# Patient Record
Sex: FEMALE | Race: OTHER | NOT HISPANIC OR LATINO | URBAN - METROPOLITAN AREA
[De-identification: names, ages, dates, MRNs, and addresses within clinical notes are randomized per-mention and may not be internally consistent; named-entity substitution may affect disease eponyms.]

---

## 2017-09-08 ENCOUNTER — EMERGENCY (EMERGENCY)
Facility: HOSPITAL | Age: 57
LOS: 1 days | Discharge: HOME | End: 2017-09-08
Attending: EMERGENCY MEDICINE
Payer: MEDICAID

## 2017-09-08 VITALS
WEIGHT: 154.98 LBS | RESPIRATION RATE: 16 BRPM | DIASTOLIC BLOOD PRESSURE: 86 MMHG | TEMPERATURE: 98 F | HEART RATE: 76 BPM | HEIGHT: 65 IN | OXYGEN SATURATION: 100 % | SYSTOLIC BLOOD PRESSURE: 151 MMHG

## 2017-09-08 DIAGNOSIS — Z79.84 LONG TERM (CURRENT) USE OF ORAL HYPOGLYCEMIC DRUGS: ICD-10-CM

## 2017-09-08 DIAGNOSIS — E11.9 TYPE 2 DIABETES MELLITUS WITHOUT COMPLICATIONS: ICD-10-CM

## 2017-09-08 PROCEDURE — 73562 X-RAY EXAM OF KNEE 3: CPT | Mod: 26,LT

## 2017-09-08 PROCEDURE — 99283 EMERGENCY DEPT VISIT LOW MDM: CPT | Mod: 25

## 2017-09-08 PROCEDURE — 73562 X-RAY EXAM OF KNEE 3: CPT

## 2017-09-08 RX ORDER — METFORMIN HYDROCHLORIDE 850 MG/1
1 TABLET ORAL
Qty: 28 | Refills: 0
Start: 2017-09-08 | End: 2017-09-22

## 2017-09-08 NOTE — ED PROVIDER NOTE - OBJECTIVE STATEMENT
57 female from [place], DM, on metformin 500 bid, states she ran out of metformin this am. No associated symptoms. On ROS pt notes the inside of R nostril is dry and mildly painful and L knee has been bothersome for a while. No trauma, no fevers. Does not have a [facility] doctor. No fevers. No cp or sob. No leg swelling.

## 2017-09-08 NOTE — ED PROVIDER NOTE - PROGRESS NOTE DETAILS
pt does not know which pharmacy - likely causing pt harm if pt does not get medication today. will print paper presciption instead of erx for this reason.

## 2017-09-08 NOTE — ED PROVIDER NOTE - NS ED ROS FT
ROS: As noted in HPI, otherwise below --  Constitutional symptoms: Negative  Eyes: Negative  Ears, Nose, Mouth, Throat: +  Cardiovascular: Negative  Respiratory: Negative  Gastrointestinal: Negative  Genitourinary: Negative  Musculoskeletal: +  Integumentary: Negative  Neurological: Negative  Psychiatric: Negative  Endocrine: Negative  Allergic/Immunologic: Negative

## 2017-09-08 NOTE — ED PROVIDER NOTE - CARE PLAN
Principal Discharge DX:	Type 2 diabetes mellitus with complication, unspecified long term insulin use status  Goal:	L knee pain, nose discomfort

## 2017-09-08 NOTE — ED PROVIDER NOTE - PHYSICAL EXAMINATION
Gen: well appearing, of stated age, no acute distress; Head: NC, AT; ENT: MMM, no uvular deviation, mild erythema R nasal septum; Neck: supple with full ROM; Chest: CTAB, no retractions, rate normal, appears to breath comfortable; Heart: RRR S1S2 No JVD No peripheral edema b/l pulses 2+ in arms and legs; Abd: Soft non-tender, no rebound or guarding, no masses, no valencia sign, no mcburney tenderness, no CVAT; Back: No spinal deformity; Ext: Moving all 4 limbs without obvious impairment to ROM, no obvious weakness, mild edema L knee w/o warmth, ttp or diminished rom; Neuro: fluid speech, no focal deficits, oriented to person, place, situation; Psych: No anxiety, depression or pressured speech noted; Skin: no utricaria, no diffuse rash. -ncohen

## 2017-09-08 NOTE — ED ADULT NURSE NOTE - OBJECTIVE STATEMENT
AS PER SISTER  PATIENT HAS PAIN TO NOSE , ,PAIN TO LEFT KNEE,X2 DAYS.ALSO C/O DIABETES .AS PER SISTER SHE RAN OUT OF METFORMIN THIS MORNING. AS PER SISTER  PATIENT HAS PAIN TO NOSE , ,PAIN TO LEFT KNEE,X2 DAYS.ALSO C/O DIABETES .AS PER SISTER SHE RAN OUT OF METFORMIN THIS MORNING.REFUSED REPEAT VS.

## 2017-09-08 NOTE — ED PROVIDER NOTE - MEDICAL DECISION MAKING DETAILS
do not suspect septic joint, chronic arthritis more likely, nasal symptom unclear will refer to ENT. pt states she has insurance so this will not be a prob. will xr L knee and check bg.

## 2017-09-08 NOTE — ED ADULT NURSE NOTE - CHIEF COMPLAINT QUOTE
" she's diabetic and with blood pressure, her nose is bothering her and pain in her leg for 3 months" as per sister interpreting, " she needs machine for her diabetis '

## 2017-12-19 NOTE — ED ADULT TRIAGE NOTE - CHIEF COMPLAINT QUOTE
" she's diabetic and with blood pressure, her nose is bothering her and pain in her leg for 3 months" as per sister interpreting " she's diabetic and with blood pressure, her nose is bothering her and pain in her leg for 3 months" as per sister interpreting, " she needs machine for her diabetis ' home

## 2019-10-30 ENCOUNTER — INPATIENT (INPATIENT)
Facility: HOSPITAL | Age: 59
LOS: 1 days | Discharge: HOME | DRG: 641 | End: 2019-11-01
Attending: STUDENT IN AN ORGANIZED HEALTH CARE EDUCATION/TRAINING PROGRAM | Admitting: STUDENT IN AN ORGANIZED HEALTH CARE EDUCATION/TRAINING PROGRAM
Payer: MEDICAID

## 2019-10-30 VITALS
RESPIRATION RATE: 18 BRPM | TEMPERATURE: 98 F | DIASTOLIC BLOOD PRESSURE: 84 MMHG | HEART RATE: 132 BPM | WEIGHT: 132.28 LBS | OXYGEN SATURATION: 98 % | SYSTOLIC BLOOD PRESSURE: 140 MMHG

## 2019-10-30 LAB
ALBUMIN SERPL ELPH-MCNC: 4 G/DL — SIGNIFICANT CHANGE UP (ref 3.5–5)
ALP SERPL-CCNC: 77 U/L — SIGNIFICANT CHANGE UP (ref 40–120)
ALT FLD-CCNC: 99 U/L DA — HIGH (ref 10–60)
ANION GAP SERPL CALC-SCNC: 11 MMOL/L — SIGNIFICANT CHANGE UP (ref 5–17)
APPEARANCE UR: CLEAR — SIGNIFICANT CHANGE UP
APTT BLD: 29.6 SEC — SIGNIFICANT CHANGE UP (ref 27.5–36.3)
AST SERPL-CCNC: 95 U/L — HIGH (ref 10–40)
BASOPHILS # BLD AUTO: 0.01 K/UL — SIGNIFICANT CHANGE UP (ref 0–0.2)
BASOPHILS NFR BLD AUTO: 0.2 % — SIGNIFICANT CHANGE UP (ref 0–2)
BILIRUB SERPL-MCNC: 0.6 MG/DL — SIGNIFICANT CHANGE UP (ref 0.2–1.2)
BILIRUB UR-MCNC: NEGATIVE — SIGNIFICANT CHANGE UP
BUN SERPL-MCNC: 13 MG/DL — SIGNIFICANT CHANGE UP (ref 7–18)
CALCIUM SERPL-MCNC: 8.6 MG/DL — SIGNIFICANT CHANGE UP (ref 8.4–10.5)
CHLORIDE SERPL-SCNC: 97 MMOL/L — SIGNIFICANT CHANGE UP (ref 96–108)
CO2 SERPL-SCNC: 26 MMOL/L — SIGNIFICANT CHANGE UP (ref 22–31)
COLOR SPEC: YELLOW — SIGNIFICANT CHANGE UP
CREAT SERPL-MCNC: 1.02 MG/DL — SIGNIFICANT CHANGE UP (ref 0.5–1.3)
DIFF PNL FLD: NEGATIVE — SIGNIFICANT CHANGE UP
EOSINOPHIL # BLD AUTO: 0.13 K/UL — SIGNIFICANT CHANGE UP (ref 0–0.5)
EOSINOPHIL NFR BLD AUTO: 3.1 % — SIGNIFICANT CHANGE UP (ref 0–6)
FLU A RESULT: SIGNIFICANT CHANGE UP
FLU A RESULT: SIGNIFICANT CHANGE UP
FLUAV AG NPH QL: SIGNIFICANT CHANGE UP
FLUBV AG NPH QL: SIGNIFICANT CHANGE UP
GLUCOSE SERPL-MCNC: 334 MG/DL — HIGH (ref 70–99)
GLUCOSE UR QL: 1000 MG/DL
HCT VFR BLD CALC: 31 % — LOW (ref 34.5–45)
HGB BLD-MCNC: 10.6 G/DL — LOW (ref 11.5–15.5)
IMM GRANULOCYTES NFR BLD AUTO: 0.2 % — SIGNIFICANT CHANGE UP (ref 0–1.5)
INR BLD: 1.1 RATIO — SIGNIFICANT CHANGE UP (ref 0.88–1.16)
KETONES UR-MCNC: NEGATIVE — SIGNIFICANT CHANGE UP
LACTATE SERPL-SCNC: 2.7 MMOL/L — HIGH (ref 0.7–2)
LACTATE SERPL-SCNC: 4.7 MMOL/L — CRITICAL HIGH (ref 0.7–2)
LEUKOCYTE ESTERASE UR-ACNC: ABNORMAL
LYMPHOCYTES # BLD AUTO: 1.07 K/UL — SIGNIFICANT CHANGE UP (ref 1–3.3)
LYMPHOCYTES # BLD AUTO: 25.8 % — SIGNIFICANT CHANGE UP (ref 13–44)
MCHC RBC-ENTMCNC: 28.2 PG — SIGNIFICANT CHANGE UP (ref 27–34)
MCHC RBC-ENTMCNC: 34.2 GM/DL — SIGNIFICANT CHANGE UP (ref 32–36)
MCV RBC AUTO: 82.4 FL — SIGNIFICANT CHANGE UP (ref 80–100)
MONOCYTES # BLD AUTO: 0.44 K/UL — SIGNIFICANT CHANGE UP (ref 0–0.9)
MONOCYTES NFR BLD AUTO: 10.6 % — SIGNIFICANT CHANGE UP (ref 2–14)
NEUTROPHILS # BLD AUTO: 2.48 K/UL — SIGNIFICANT CHANGE UP (ref 1.8–7.4)
NEUTROPHILS NFR BLD AUTO: 60.1 % — SIGNIFICANT CHANGE UP (ref 43–77)
NITRITE UR-MCNC: NEGATIVE — SIGNIFICANT CHANGE UP
NRBC # BLD: 0 /100 WBCS — SIGNIFICANT CHANGE UP (ref 0–0)
PH UR: 7 — SIGNIFICANT CHANGE UP (ref 5–8)
PLATELET # BLD AUTO: 190 K/UL — SIGNIFICANT CHANGE UP (ref 150–400)
POTASSIUM SERPL-MCNC: 2.8 MMOL/L — CRITICAL LOW (ref 3.5–5.3)
POTASSIUM SERPL-SCNC: 2.8 MMOL/L — CRITICAL LOW (ref 3.5–5.3)
PROT SERPL-MCNC: 7.5 G/DL — SIGNIFICANT CHANGE UP (ref 6–8.3)
PROT UR-MCNC: NEGATIVE — SIGNIFICANT CHANGE UP
PROTHROM AB SERPL-ACNC: 12.3 SEC — SIGNIFICANT CHANGE UP (ref 10–12.9)
RBC # BLD: 3.76 M/UL — LOW (ref 3.8–5.2)
RBC # FLD: 15 % — HIGH (ref 10.3–14.5)
RSV RESULT: SIGNIFICANT CHANGE UP
RSV RNA RESP QL NAA+PROBE: SIGNIFICANT CHANGE UP
SODIUM SERPL-SCNC: 134 MMOL/L — LOW (ref 135–145)
SP GR SPEC: 1.01 — SIGNIFICANT CHANGE UP (ref 1.01–1.02)
UROBILINOGEN FLD QL: NEGATIVE — SIGNIFICANT CHANGE UP
WBC # BLD: 4.14 K/UL — SIGNIFICANT CHANGE UP (ref 3.8–10.5)
WBC # FLD AUTO: 4.14 K/UL — SIGNIFICANT CHANGE UP (ref 3.8–10.5)

## 2019-10-30 PROCEDURE — 71045 X-RAY EXAM CHEST 1 VIEW: CPT | Mod: 26

## 2019-10-30 RX ORDER — POTASSIUM CHLORIDE 20 MEQ
10 PACKET (EA) ORAL
Refills: 0 | Status: COMPLETED | OUTPATIENT
Start: 2019-10-30 | End: 2019-10-31

## 2019-10-30 RX ORDER — SODIUM CHLORIDE 9 MG/ML
1000 INJECTION INTRAMUSCULAR; INTRAVENOUS; SUBCUTANEOUS ONCE
Refills: 0 | Status: COMPLETED | OUTPATIENT
Start: 2019-10-30 | End: 2019-10-30

## 2019-10-30 RX ORDER — POTASSIUM CHLORIDE 20 MEQ
10 PACKET (EA) ORAL
Refills: 0 | Status: DISCONTINUED | OUTPATIENT
Start: 2019-10-30 | End: 2019-10-30

## 2019-10-30 RX ORDER — PIPERACILLIN AND TAZOBACTAM 4; .5 G/20ML; G/20ML
3.38 INJECTION, POWDER, LYOPHILIZED, FOR SOLUTION INTRAVENOUS ONCE
Refills: 0 | Status: COMPLETED | OUTPATIENT
Start: 2019-10-30 | End: 2019-10-30

## 2019-10-30 RX ORDER — SODIUM CHLORIDE 9 MG/ML
1850 INJECTION INTRAMUSCULAR; INTRAVENOUS; SUBCUTANEOUS ONCE
Refills: 0 | Status: COMPLETED | OUTPATIENT
Start: 2019-10-30 | End: 2019-10-30

## 2019-10-30 RX ADMIN — SODIUM CHLORIDE 1000 MILLILITER(S): 9 INJECTION INTRAMUSCULAR; INTRAVENOUS; SUBCUTANEOUS at 23:22

## 2019-10-30 RX ADMIN — SODIUM CHLORIDE 1850 MILLILITER(S): 9 INJECTION INTRAMUSCULAR; INTRAVENOUS; SUBCUTANEOUS at 19:10

## 2019-10-30 RX ADMIN — PIPERACILLIN AND TAZOBACTAM 3.38 GRAM(S): 4; .5 INJECTION, POWDER, LYOPHILIZED, FOR SOLUTION INTRAVENOUS at 20:00

## 2019-10-30 RX ADMIN — Medication 100 MILLIEQUIVALENT(S): at 23:21

## 2019-10-30 RX ADMIN — Medication 100 MILLIEQUIVALENT(S): at 21:37

## 2019-10-30 RX ADMIN — SODIUM CHLORIDE 1850 MILLILITER(S): 9 INJECTION INTRAMUSCULAR; INTRAVENOUS; SUBCUTANEOUS at 18:08

## 2019-10-30 RX ADMIN — PIPERACILLIN AND TAZOBACTAM 200 GRAM(S): 4; .5 INJECTION, POWDER, LYOPHILIZED, FOR SOLUTION INTRAVENOUS at 18:08

## 2019-10-30 RX ADMIN — SODIUM CHLORIDE 1000 MILLILITER(S): 9 INJECTION INTRAMUSCULAR; INTRAVENOUS; SUBCUTANEOUS at 01:11

## 2019-10-30 NOTE — ED PROVIDER NOTE - PROGRESS NOTE DETAILS
Patient is resting comfortably, NAD. Patient remains comfortable. Still tachycardic after IV fluids. Will give more IV fluids Patient still tachy. Will add troponin for possible myocarditis Patient is resting comfortably, NAD. Endorsed to DR. Ventura.

## 2019-10-30 NOTE — ED ADULT NURSE NOTE - OBJECTIVE STATEMENT
pt came in for c/o dizziness. pt also c/o fever, BLE joint pain & swelling. pt was dx with dengue fever 1 month ago after [place] the [place] from [facility]. Pt denies cp, sob, cough, HA. breathing unlabored.

## 2019-10-30 NOTE — ED ADULT TRIAGE NOTE - CHIEF COMPLAINT QUOTE
as per the daughter pt started having fever and joint pain and b/l leg swelling . pt had h/o Dengue fever 1 month ago came from [facility] on 10/29

## 2019-10-30 NOTE — ED PROVIDER NOTE - CLINICAL SUMMARY MEDICAL DECISION MAKING FREE TEXT BOX
Patient with sepsis due to unknown source requires inpatient admission for inpatient antibiotic and iv fluid.

## 2019-10-30 NOTE — ED ADULT NURSE NOTE - CHIEF COMPLAINT QUOTE
as per the daughter pt started having fever and joint pain and b/l leg swelling . pt had h/o Dengue fever 1 month ago came from [facility] on 10/29

## 2019-10-30 NOTE — ED ADULT NURSE NOTE - NSIMPLEMENTINTERV_GEN_ALL_ED
Implemented All Universal Safety Interventions:  [place] to call system. Call bell, personal items and telephone within reach. Instruct patient to call for assistance. Room bathroom lighting operational. Non-slip footwear when patient is off stretcher. Physically safe environment: no spills, clutter or unnecessary equipment. Stretcher in lowest position, wheels locked, appropriate side rails in place.

## 2019-10-30 NOTE — ED ADULT NURSE NOTE - ED STAT RN HANDOFF DETAILS 2
pt.remained  stable  denies  pain. on cm  sinus  tachycardia. denies  chest  pain. transfer to holding  area. report given to rn sopheap.pt.not  in  distress

## 2019-10-30 NOTE — ED PROVIDER NOTE - OBJECTIVE STATEMENT
58 y/o female with a PMHx of dengue fever diagnosed in [facility] and no significant PSHx presents to the ER aftrr being diagnosed with dengue fever and staying in the hospital in [facility] for a week . Patient states that after that she now has intermittent fever and weakness. Patient states that she returned from [facility] yesterday. Patient denies headache, chest pain, shortness of breath , nausea, vomiting, diarrhea, or any other acute complaints. NKDA

## 2019-10-31 DIAGNOSIS — E11.9 TYPE 2 DIABETES MELLITUS WITHOUT COMPLICATIONS: ICD-10-CM

## 2019-10-31 DIAGNOSIS — R53.1 WEAKNESS: ICD-10-CM

## 2019-10-31 DIAGNOSIS — R79.89 OTHER SPECIFIED ABNORMAL FINDINGS OF BLOOD CHEMISTRY: ICD-10-CM

## 2019-10-31 DIAGNOSIS — A41.9 SEPSIS, UNSPECIFIED ORGANISM: ICD-10-CM

## 2019-10-31 DIAGNOSIS — I10 ESSENTIAL (PRIMARY) HYPERTENSION: ICD-10-CM

## 2019-10-31 DIAGNOSIS — R74.0 NONSPECIFIC ELEVATION OF LEVELS OF TRANSAMINASE AND LACTIC ACID DEHYDROGENASE [LDH]: ICD-10-CM

## 2019-10-31 DIAGNOSIS — Z29.9 ENCOUNTER FOR PROPHYLACTIC MEASURES, UNSPECIFIED: ICD-10-CM

## 2019-10-31 LAB
ALBUMIN SERPL ELPH-MCNC: 3.4 G/DL — LOW (ref 3.5–5)
ALP SERPL-CCNC: 73 U/L — SIGNIFICANT CHANGE UP (ref 40–120)
ALT FLD-CCNC: 92 U/L DA — HIGH (ref 10–60)
ANION GAP SERPL CALC-SCNC: 7 MMOL/L — SIGNIFICANT CHANGE UP (ref 5–17)
AST SERPL-CCNC: 78 U/L — HIGH (ref 10–40)
BASOPHILS # BLD AUTO: 0.01 K/UL — SIGNIFICANT CHANGE UP (ref 0–0.2)
BASOPHILS NFR BLD AUTO: 0.2 % — SIGNIFICANT CHANGE UP (ref 0–2)
BILIRUB SERPL-MCNC: 0.6 MG/DL — SIGNIFICANT CHANGE UP (ref 0.2–1.2)
BUN SERPL-MCNC: 7 MG/DL — SIGNIFICANT CHANGE UP (ref 7–18)
CALCIUM SERPL-MCNC: 7.8 MG/DL — LOW (ref 8.4–10.5)
CALCIUM SERPL-MCNC: 8 MG/DL — LOW (ref 8.4–10.5)
CALCIUM SERPL-MCNC: 8.2 MG/DL — LOW (ref 8.4–10.5)
CHLORIDE SERPL-SCNC: 104 MMOL/L — SIGNIFICANT CHANGE UP (ref 96–108)
CHLORIDE SERPL-SCNC: 104 MMOL/L — SIGNIFICANT CHANGE UP (ref 96–108)
CHLORIDE SERPL-SCNC: 105 MMOL/L — SIGNIFICANT CHANGE UP (ref 96–108)
CHOLEST SERPL-MCNC: 154 MG/DL — SIGNIFICANT CHANGE UP (ref 10–199)
CK MB BLD-MCNC: <1.4 % — SIGNIFICANT CHANGE UP (ref 0–3.5)
CK MB CFR SERPL CALC: <1 NG/ML — SIGNIFICANT CHANGE UP (ref 0–3.6)
CK SERPL-CCNC: 74 U/L — SIGNIFICANT CHANGE UP (ref 21–215)
CO2 SERPL-SCNC: 26 MMOL/L — SIGNIFICANT CHANGE UP (ref 22–31)
CO2 SERPL-SCNC: 27 MMOL/L — SIGNIFICANT CHANGE UP (ref 22–31)
CO2 SERPL-SCNC: 29 MMOL/L — SIGNIFICANT CHANGE UP (ref 22–31)
CREAT SERPL-MCNC: 0.72 MG/DL — SIGNIFICANT CHANGE UP (ref 0.5–1.3)
CREAT SERPL-MCNC: 0.72 MG/DL — SIGNIFICANT CHANGE UP (ref 0.5–1.3)
CREAT SERPL-MCNC: 0.83 MG/DL — SIGNIFICANT CHANGE UP (ref 0.5–1.3)
CULTURE RESULTS: SIGNIFICANT CHANGE UP
CULTURE RESULTS: SIGNIFICANT CHANGE UP
EOSINOPHIL # BLD AUTO: 0.14 K/UL — SIGNIFICANT CHANGE UP (ref 0–0.5)
EOSINOPHIL NFR BLD AUTO: 3 % — SIGNIFICANT CHANGE UP (ref 0–6)
GLUCOSE BLDC GLUCOMTR-MCNC: 140 MG/DL — HIGH (ref 70–99)
GLUCOSE BLDC GLUCOMTR-MCNC: 146 MG/DL — HIGH (ref 70–99)
GLUCOSE BLDC GLUCOMTR-MCNC: 164 MG/DL — HIGH (ref 70–99)
GLUCOSE BLDC GLUCOMTR-MCNC: 260 MG/DL — HIGH (ref 70–99)
GLUCOSE BLDC GLUCOMTR-MCNC: 293 MG/DL — HIGH (ref 70–99)
GLUCOSE SERPL-MCNC: 147 MG/DL — HIGH (ref 70–99)
GLUCOSE SERPL-MCNC: 168 MG/DL — HIGH (ref 70–99)
GLUCOSE SERPL-MCNC: 264 MG/DL — HIGH (ref 70–99)
HBA1C BLD-MCNC: 7.2 % — HIGH (ref 4–5.6)
HCT VFR BLD CALC: 31.5 % — LOW (ref 34.5–45)
HDLC SERPL-MCNC: 44 MG/DL — LOW
HGB BLD-MCNC: 10.7 G/DL — LOW (ref 11.5–15.5)
IMM GRANULOCYTES NFR BLD AUTO: 0.2 % — SIGNIFICANT CHANGE UP (ref 0–1.5)
LACTATE SERPL-SCNC: 2.4 MMOL/L — HIGH (ref 0.7–2)
LIPID PNL WITH DIRECT LDL SERPL: 80 MG/DL — SIGNIFICANT CHANGE UP
LYMPHOCYTES # BLD AUTO: 1.17 K/UL — SIGNIFICANT CHANGE UP (ref 1–3.3)
LYMPHOCYTES # BLD AUTO: 25.4 % — SIGNIFICANT CHANGE UP (ref 13–44)
MAGNESIUM SERPL-MCNC: 1 MG/DL — CRITICAL LOW (ref 1.6–2.6)
MAGNESIUM SERPL-MCNC: 1.5 MG/DL — LOW (ref 1.6–2.6)
MCHC RBC-ENTMCNC: 28 PG — SIGNIFICANT CHANGE UP (ref 27–34)
MCHC RBC-ENTMCNC: 34 GM/DL — SIGNIFICANT CHANGE UP (ref 32–36)
MCV RBC AUTO: 82.5 FL — SIGNIFICANT CHANGE UP (ref 80–100)
MONOCYTES # BLD AUTO: 0.43 K/UL — SIGNIFICANT CHANGE UP (ref 0–0.9)
MONOCYTES NFR BLD AUTO: 9.3 % — SIGNIFICANT CHANGE UP (ref 2–14)
NEUTROPHILS # BLD AUTO: 2.84 K/UL — SIGNIFICANT CHANGE UP (ref 1.8–7.4)
NEUTROPHILS NFR BLD AUTO: 61.9 % — SIGNIFICANT CHANGE UP (ref 43–77)
NRBC # BLD: 0 /100 WBCS — SIGNIFICANT CHANGE UP (ref 0–0)
NT-PROBNP SERPL-SCNC: 2848 PG/ML — HIGH (ref 0–125)
PHOSPHATE SERPL-MCNC: 2.8 MG/DL — SIGNIFICANT CHANGE UP (ref 2.5–4.5)
PLATELET # BLD AUTO: 191 K/UL — SIGNIFICANT CHANGE UP (ref 150–400)
POTASSIUM SERPL-MCNC: 3 MMOL/L — LOW (ref 3.5–5.3)
POTASSIUM SERPL-MCNC: 3.3 MMOL/L — LOW (ref 3.5–5.3)
POTASSIUM SERPL-MCNC: 3.5 MMOL/L — SIGNIFICANT CHANGE UP (ref 3.5–5.3)
POTASSIUM SERPL-SCNC: 3 MMOL/L — LOW (ref 3.5–5.3)
POTASSIUM SERPL-SCNC: 3.3 MMOL/L — LOW (ref 3.5–5.3)
POTASSIUM SERPL-SCNC: 3.5 MMOL/L — SIGNIFICANT CHANGE UP (ref 3.5–5.3)
PROT SERPL-MCNC: 7.4 G/DL — SIGNIFICANT CHANGE UP (ref 6–8.3)
RBC # BLD: 3.82 M/UL — SIGNIFICANT CHANGE UP (ref 3.8–5.2)
RBC # FLD: 15.1 % — HIGH (ref 10.3–14.5)
SODIUM SERPL-SCNC: 138 MMOL/L — SIGNIFICANT CHANGE UP (ref 135–145)
SODIUM SERPL-SCNC: 138 MMOL/L — SIGNIFICANT CHANGE UP (ref 135–145)
SODIUM SERPL-SCNC: 140 MMOL/L — SIGNIFICANT CHANGE UP (ref 135–145)
SPECIMEN SOURCE: SIGNIFICANT CHANGE UP
SPECIMEN SOURCE: SIGNIFICANT CHANGE UP
TOTAL CHOLESTEROL/HDL RATIO MEASUREMENT: 3.5 RATIO — SIGNIFICANT CHANGE UP (ref 3.3–7.1)
TRIGL SERPL-MCNC: 149 MG/DL — SIGNIFICANT CHANGE UP (ref 10–149)
TROPONIN I SERPL-MCNC: 0.02 NG/ML — SIGNIFICANT CHANGE UP (ref 0–0.04)
TSH SERPL-MCNC: 1.19 UU/ML — SIGNIFICANT CHANGE UP (ref 0.34–4.82)
VIT B12 SERPL-MCNC: 586 PG/ML — SIGNIFICANT CHANGE UP (ref 232–1245)
WBC # BLD: 4.6 K/UL — SIGNIFICANT CHANGE UP (ref 3.8–10.5)
WBC # FLD AUTO: 4.6 K/UL — SIGNIFICANT CHANGE UP (ref 3.8–10.5)

## 2019-10-31 PROCEDURE — 99285 EMERGENCY DEPT VISIT HI MDM: CPT

## 2019-10-31 PROCEDURE — 99223 1ST HOSP IP/OBS HIGH 75: CPT

## 2019-10-31 PROCEDURE — 71275 CT ANGIOGRAPHY CHEST: CPT | Mod: 26

## 2019-10-31 RX ORDER — GLUCAGON INJECTION, SOLUTION 0.5 MG/.1ML
1 INJECTION, SOLUTION SUBCUTANEOUS ONCE
Refills: 0 | Status: DISCONTINUED | OUTPATIENT
Start: 2019-10-31 | End: 2019-11-01

## 2019-10-31 RX ORDER — SODIUM CHLORIDE 9 MG/ML
1000 INJECTION, SOLUTION INTRAVENOUS
Refills: 0 | Status: DISCONTINUED | OUTPATIENT
Start: 2019-10-31 | End: 2019-11-01

## 2019-10-31 RX ORDER — INFLUENZA VIRUS VACCINE 15; 15; 15; 15 UG/.5ML; UG/.5ML; UG/.5ML; UG/.5ML
0.5 SUSPENSION INTRAMUSCULAR ONCE
Refills: 0 | Status: DISCONTINUED | OUTPATIENT
Start: 2019-10-31 | End: 2019-11-01

## 2019-10-31 RX ORDER — DILTIAZEM HCL 120 MG
90 CAPSULE, EXT RELEASE 24 HR ORAL DAILY
Refills: 0 | Status: DISCONTINUED | OUTPATIENT
Start: 2019-10-31 | End: 2019-10-31

## 2019-10-31 RX ORDER — INSULIN LISPRO 100/ML
VIAL (ML) SUBCUTANEOUS AT BEDTIME
Refills: 0 | Status: DISCONTINUED | OUTPATIENT
Start: 2019-10-31 | End: 2019-11-01

## 2019-10-31 RX ORDER — DEXTROSE 50 % IN WATER 50 %
15 SYRINGE (ML) INTRAVENOUS ONCE
Refills: 0 | Status: DISCONTINUED | OUTPATIENT
Start: 2019-10-31 | End: 2019-11-01

## 2019-10-31 RX ORDER — MAGNESIUM SULFATE 500 MG/ML
2 VIAL (ML) INJECTION ONCE
Refills: 0 | Status: COMPLETED | OUTPATIENT
Start: 2019-10-31 | End: 2019-10-31

## 2019-10-31 RX ORDER — PANTOPRAZOLE SODIUM 20 MG/1
1 TABLET, DELAYED RELEASE ORAL
Qty: 0 | Refills: 0 | DISCHARGE

## 2019-10-31 RX ORDER — PANTOPRAZOLE SODIUM 20 MG/1
40 TABLET, DELAYED RELEASE ORAL
Refills: 0 | Status: DISCONTINUED | OUTPATIENT
Start: 2019-10-31 | End: 2019-11-01

## 2019-10-31 RX ORDER — DEXTROSE 10 % IN WATER 10 %
125 INTRAVENOUS SOLUTION INTRAVENOUS ONCE
Refills: 0 | Status: DISCONTINUED | OUTPATIENT
Start: 2019-10-31 | End: 2019-11-01

## 2019-10-31 RX ORDER — POTASSIUM CHLORIDE 20 MEQ
40 PACKET (EA) ORAL EVERY 4 HOURS
Refills: 0 | Status: COMPLETED | OUTPATIENT
Start: 2019-10-31 | End: 2019-10-31

## 2019-10-31 RX ORDER — MAGNESIUM SULFATE 500 MG/ML
2 VIAL (ML) INJECTION ONCE
Refills: 0 | Status: DISCONTINUED | OUTPATIENT
Start: 2019-10-31 | End: 2019-10-31

## 2019-10-31 RX ORDER — DEXTROSE 10 % IN WATER 10 %
250 INTRAVENOUS SOLUTION INTRAVENOUS ONCE
Refills: 0 | Status: DISCONTINUED | OUTPATIENT
Start: 2019-10-31 | End: 2019-11-01

## 2019-10-31 RX ORDER — SODIUM CHLORIDE 9 MG/ML
1000 INJECTION INTRAMUSCULAR; INTRAVENOUS; SUBCUTANEOUS
Refills: 0 | Status: DISCONTINUED | OUTPATIENT
Start: 2019-10-31 | End: 2019-10-31

## 2019-10-31 RX ORDER — POTASSIUM CHLORIDE 20 MEQ
40 PACKET (EA) ORAL ONCE
Refills: 0 | Status: COMPLETED | OUTPATIENT
Start: 2019-10-31 | End: 2019-10-31

## 2019-10-31 RX ORDER — INSULIN LISPRO 100/ML
VIAL (ML) SUBCUTANEOUS
Refills: 0 | Status: DISCONTINUED | OUTPATIENT
Start: 2019-10-31 | End: 2019-11-01

## 2019-10-31 RX ADMIN — Medication 40 MILLIEQUIVALENT(S): at 21:36

## 2019-10-31 RX ADMIN — SODIUM CHLORIDE 75 MILLILITER(S): 9 INJECTION INTRAMUSCULAR; INTRAVENOUS; SUBCUTANEOUS at 02:22

## 2019-10-31 RX ADMIN — Medication 3: at 14:18

## 2019-10-31 RX ADMIN — Medication 100 MILLIEQUIVALENT(S): at 01:27

## 2019-10-31 RX ADMIN — Medication 40 MILLIEQUIVALENT(S): at 05:02

## 2019-10-31 RX ADMIN — Medication 40 MILLIEQUIVALENT(S): at 16:26

## 2019-10-31 RX ADMIN — Medication 50 GRAM(S): at 06:02

## 2019-10-31 RX ADMIN — Medication 50 GRAM(S): at 16:29

## 2019-10-31 RX ADMIN — PANTOPRAZOLE SODIUM 40 MILLIGRAM(S): 20 TABLET, DELAYED RELEASE ORAL at 06:03

## 2019-10-31 NOTE — H&P ADULT - PROBLEM SELECTOR PLAN 4
home meds: medication from [facility]  Hold oral hypoglycemics  c/w HSS   FU A1C  Monitor fingerstick and adjust meds home meds: medication from [facility], did not have bedside  Hold oral hypoglycemics  c/w HSS   FU A1C  Monitor fingerstick and adjust meds

## 2019-10-31 NOTE — H&P ADULT - PROBLEM SELECTOR PLAN 6
IMPROVE VTE Individual Risk Assessment  RISK                                                                Points  [  ] Previous VTE                                                  3  [  ] Thrombophilia                                               2  [  ] Lower limb paralysis                                      2        (unable to hold up >15 seconds)    [  ] Current Cancer                                              2         (within 6 months)  [  ] Immobilization > 24 hrs                                1  [  ] ICU/CCU stay > 24 hours                              1  [x  ] Age > 60                                                      1  IMPROVE VTE Score 1,   no indiaction for DVT proph

## 2019-10-31 NOTE — H&P ADULT - ASSESSMENT
59F from home, PMHx of HTN, DM and PSH of kidney stone removal, hysterectomy, cholecystectomy presented to ED c/o weakness. 59F from home, PMHx of HTN, DM and PSH of kidney stone removal, hysterectomy, cholecystectomy presented to ED c/o weakness.    No evidence of pulmonary embolism.   2. No evidence of thoracic aortic dissection.   3. Mild bilateral lower lobe dependent air space   opacity-atelectasis/edema.   4. Several peripheral subcentimeter soft tissue nodules, particularly   right   middle lobe.

## 2019-10-31 NOTE — H&P ADULT - PROBLEM SELECTOR PLAN 2
p/w lactate 4.7  s/p NS 2.8lt bolus in ED  trended down to 2.4  no WBC, afebrile, UA and CXR negative, unlikely septic  saturating well on RA, no resp.distress, unlikely hypoxic  FU repeat lactate

## 2019-10-31 NOTE — H&P ADULT - HISTORY OF PRESENT ILLNESS
59F from home, PMHx of HTN, DM and PSH of kidney stone removal, hysterectomy, cholecystectomy presented to ED c/o weakness. She was hospitalized for 2 weeks due to dengue while visiting [facility] on Aug and returned back to [facility] on 27th Oct. She complaints of generalized weakness and dizziness with loss of appetite. Denies any fever, cough, chest pain, shortness of breath, N/V, urinary or bowel complaint.

## 2019-10-31 NOTE — H&P ADULT - PROBLEM SELECTOR PLAN 1
p/w generalized weakness with loss of appetite  CTA chest showed no PE, Mild bilateral lower lobe dependent air space   opacity-atelectasis/edema. Several peripheral subcentimeter soft tissue nodules, particularly right middle lobe. p/w generalized weakness with loss of appetite  vitals stable except tachy  EKG shows S.tachy   trop T1 negative, FU T2  CTA chest showed no PE, Mild bilateral lower lobe dependent air space   opacity-atelectasis/edema. Several peripheral subcentimeter soft tissue nodules, particularly right middle lobe.  lactate elevated on admission  ?myocarditis, sequale of dengue  s/p levaquin and zosyn in ED  FU ECHO

## 2019-10-31 NOTE — H&P ADULT - PROBLEM SELECTOR PLAN 5
Home meds: cardizem and amlodipine (changed on Aug, was on kendy, ARBs previously)  hold BP meds  monitor BP and adjust meds

## 2019-10-31 NOTE — H&P ADULT - NSHPREVIEWOFSYSTEMS_GEN_ALL_CORE
CONSTITUTIONAL: No fever, +dizziness, weakness  EYES: no acute visual disturbances  NECK: No pain or stiffness  RESPIRATORY: No cough; No shortness of breath  CARDIOVASCULAR: No chest pain, no palpitations  GASTROINTESTINAL: No pain. No nausea or vomiting; No diarrhea   NEUROLOGICAL: No headache or numbness, no tremors  MUSCULOSKELETAL: No joint pain, no muscle pain  GENITOURINARY: no dysuria, no frequency, no hesitancy  PSYCHIATRY: no depression , no anxiety  ALL OTHER  ROS negative

## 2019-10-31 NOTE — H&P ADULT - ATTENDING COMMENTS
Patient seen and examined ; case was discussed with the admitting resident    ROS: as in the HPI; all other ROS negative    SH and family history as above    Vital Signs Last 24 Hrs  T(C): 36.7 (30 Oct 2019 23:23), Max: 37.4 (30 Oct 2019 17:38)  T(F): 98.1 (30 Oct 2019 23:23), Max: 99.4 (30 Oct 2019 17:38)  HR: 118 (30 Oct 2019 23:23) (118 - 132)  BP: 132/88 (30 Oct 2019 23:23) (132/88 - 140/84)  BP(mean): --  RR: 18 (30 Oct 2019 23:23) (18 - 20)  SpO2: 100% (30 Oct 2019 23:23) (98% - 100%)    GEN: NAD  HEENT- normocephalic; mouth moist  CVS- S1S2+  LUNGS- clear to auscultation; no wheezing  ABD: Soft , nontender, nondistended, Bowel sounds are present  EXTREMITY: no calf tenderness, no cyanosis, no edema  NEURO: AAOx3; non focal neurologic exam; cranial nerves grossly intact  PSYCH: normal affect and behavior  BACK: no swelling or mass;   VASCULAR: ++ distal peripheral pulses  SKIN: warm and dry.       Labs Reviewed:                         10.6   4.14  )-----------( 190      ( 30 Oct 2019 18:06 )             31.0     10-30    134<L>  |  97  |  13  ----------------------------<  334<H>  2.8<LL>   |  26  |  1.02    Ca    8.6      30 Oct 2019 18:06    TPro  7.5  /  Alb  4.0  /  TBili  0.6  /  DBili  x   /  AST  95<H>  /  ALT  99<H>  /  AlkPhos  77  10-30    CARDIAC MARKERS ( 30 Oct 2019 18:06 )  <0.015 ng/mL / x     / x     / x     / x          Urinalysis Basic - ( 30 Oct 2019 19:03 )    Color: Yellow / Appearance: Clear / S.010 / pH: x  Gluc: x / Ketone: Negative  / Bili: Negative / Urobili: Negative   Blood: x / Protein: Negative / Nitrite: Negative   Leuk Esterase: Small / RBC: 0-2 /HPF / WBC 3-5 /HPF   Sq Epi: x / Non Sq Epi: Few /HPF / Bacteria: Few /HPF      PT/INR - ( 30 Oct 2019 18:06 )   PT: 12.3 sec;   INR: 1.10 ratio         PTT - ( 30 Oct 2019 18:06 )  PTT:29.6 sec  BNP: Serum Pro-Brain Natriuretic Peptide: 2848 pg/mL (10-30 @ 18:06)    MEDICATIONS  (STANDING):  potassium chloride  10 mEq/100 mL IVPB 10 milliEquivalent(s) IV Intermittent every 1 hour    CXR reviewed  EKG Reviewed    58 y/o F with recent dx of Dengue fever (2019) in [facility] now admitted with generalized weakness, tachycardia and elevated lactate. Weakness is generalized and not clinically c/w AIDP/GBS. She had a lot of diagnostic testing in August/September (when she appears to have been readmitted with vertigo) with echo (unremarkable), MRI brain (unremarkable), CXR showing pleural effusions and nodular opacities. Her platelet papito was 35K and WBC 1.5 the second week of August.     1. Possible CHF exacerbation, cardiac sequelae of recent Dengue infection- elevated BNP, will check echo, trend troponin, monitor on tele, r/o PE given elevated pretest probability, hold ccb. Has received IVF resuscitation for elevated lactate, will hold further IVF and reassess volume status, not clinically volume overloaded   2. Elevated lactate- no clear infectious source, no abdominal pain, on metformin although less likely, echo as above   3. Sinus tachycardia   4. Elevated hepatic enzymes- RUQ US, hepatitis panel   5. Hypokalemia- denies GI losses, does not appear to be on diuretic, check Mg, reassess   6. Hyponatremia   7. Hyperglycemia- insulin naiive, give lispro and reassess, monitor requirements, if persistently elevated will need basal   8. HTN- hold antihypertensives for now   9. Anemia- at baseline per previous records, check anemia panel   Plan of care discussed with patient ;  all questions and concerns were addressed.  Discussed with Patient's family, sister provided translation per patient's request

## 2019-11-01 VITALS
OXYGEN SATURATION: 100 % | SYSTOLIC BLOOD PRESSURE: 133 MMHG | DIASTOLIC BLOOD PRESSURE: 89 MMHG | HEART RATE: 100 BPM | RESPIRATION RATE: 18 BRPM | TEMPERATURE: 98 F

## 2019-11-01 DIAGNOSIS — E87.8 OTHER DISORDERS OF ELECTROLYTE AND FLUID BALANCE, NOT ELSEWHERE CLASSIFIED: ICD-10-CM

## 2019-11-01 LAB
ANION GAP SERPL CALC-SCNC: 6 MMOL/L — SIGNIFICANT CHANGE UP (ref 5–17)
BASOPHILS # BLD AUTO: 0.01 K/UL — SIGNIFICANT CHANGE UP (ref 0–0.2)
BASOPHILS NFR BLD AUTO: 0.3 % — SIGNIFICANT CHANGE UP (ref 0–2)
BUN SERPL-MCNC: 9 MG/DL — SIGNIFICANT CHANGE UP (ref 7–18)
CALCIUM SERPL-MCNC: 8.3 MG/DL — LOW (ref 8.4–10.5)
CHLORIDE SERPL-SCNC: 106 MMOL/L — SIGNIFICANT CHANGE UP (ref 96–108)
CO2 SERPL-SCNC: 28 MMOL/L — SIGNIFICANT CHANGE UP (ref 22–31)
CREAT SERPL-MCNC: 0.74 MG/DL — SIGNIFICANT CHANGE UP (ref 0.5–1.3)
EOSINOPHIL # BLD AUTO: 0.17 K/UL — SIGNIFICANT CHANGE UP (ref 0–0.5)
EOSINOPHIL NFR BLD AUTO: 5.2 % — SIGNIFICANT CHANGE UP (ref 0–6)
GLUCOSE BLDC GLUCOMTR-MCNC: 184 MG/DL — HIGH (ref 70–99)
GLUCOSE BLDC GLUCOMTR-MCNC: 263 MG/DL — HIGH (ref 70–99)
GLUCOSE SERPL-MCNC: 156 MG/DL — HIGH (ref 70–99)
HCT VFR BLD CALC: 30.3 % — LOW (ref 34.5–45)
HCV AB S/CO SERPL IA: 0.15 S/CO — SIGNIFICANT CHANGE UP (ref 0–0.99)
HCV AB SERPL-IMP: SIGNIFICANT CHANGE UP
HGB BLD-MCNC: 10.1 G/DL — LOW (ref 11.5–15.5)
IMM GRANULOCYTES NFR BLD AUTO: 0.6 % — SIGNIFICANT CHANGE UP (ref 0–1.5)
LYMPHOCYTES # BLD AUTO: 1.01 K/UL — SIGNIFICANT CHANGE UP (ref 1–3.3)
LYMPHOCYTES # BLD AUTO: 31.1 % — SIGNIFICANT CHANGE UP (ref 13–44)
MAGNESIUM SERPL-MCNC: 1.8 MG/DL — SIGNIFICANT CHANGE UP (ref 1.6–2.6)
MCHC RBC-ENTMCNC: 27.8 PG — SIGNIFICANT CHANGE UP (ref 27–34)
MCHC RBC-ENTMCNC: 33.3 GM/DL — SIGNIFICANT CHANGE UP (ref 32–36)
MCV RBC AUTO: 83.5 FL — SIGNIFICANT CHANGE UP (ref 80–100)
MONOCYTES # BLD AUTO: 0.33 K/UL — SIGNIFICANT CHANGE UP (ref 0–0.9)
MONOCYTES NFR BLD AUTO: 10.2 % — SIGNIFICANT CHANGE UP (ref 2–14)
NEUTROPHILS # BLD AUTO: 1.71 K/UL — LOW (ref 1.8–7.4)
NEUTROPHILS NFR BLD AUTO: 52.6 % — SIGNIFICANT CHANGE UP (ref 43–77)
NRBC # BLD: 0 /100 WBCS — SIGNIFICANT CHANGE UP (ref 0–0)
PLATELET # BLD AUTO: 182 K/UL — SIGNIFICANT CHANGE UP (ref 150–400)
POTASSIUM SERPL-MCNC: 3.6 MMOL/L — SIGNIFICANT CHANGE UP (ref 3.5–5.3)
POTASSIUM SERPL-SCNC: 3.6 MMOL/L — SIGNIFICANT CHANGE UP (ref 3.5–5.3)
RBC # BLD: 3.63 M/UL — LOW (ref 3.8–5.2)
RBC # FLD: 15.5 % — HIGH (ref 10.3–14.5)
SODIUM SERPL-SCNC: 140 MMOL/L — SIGNIFICANT CHANGE UP (ref 135–145)
WBC # BLD: 3.25 K/UL — LOW (ref 3.8–10.5)
WBC # FLD AUTO: 3.25 K/UL — LOW (ref 3.8–10.5)

## 2019-11-01 PROCEDURE — 85610 PROTHROMBIN TIME: CPT

## 2019-11-01 PROCEDURE — 84100 ASSAY OF PHOSPHORUS: CPT

## 2019-11-01 PROCEDURE — 82553 CREATINE MB FRACTION: CPT

## 2019-11-01 PROCEDURE — 83036 HEMOGLOBIN GLYCOSYLATED A1C: CPT

## 2019-11-01 PROCEDURE — 83880 ASSAY OF NATRIURETIC PEPTIDE: CPT

## 2019-11-01 PROCEDURE — 93005 ELECTROCARDIOGRAM TRACING: CPT

## 2019-11-01 PROCEDURE — 93306 TTE W/DOPPLER COMPLETE: CPT

## 2019-11-01 PROCEDURE — 85027 COMPLETE CBC AUTOMATED: CPT

## 2019-11-01 PROCEDURE — 82962 GLUCOSE BLOOD TEST: CPT

## 2019-11-01 PROCEDURE — 80061 LIPID PANEL: CPT

## 2019-11-01 PROCEDURE — 84443 ASSAY THYROID STIM HORMONE: CPT

## 2019-11-01 PROCEDURE — 80048 BASIC METABOLIC PNL TOTAL CA: CPT

## 2019-11-01 PROCEDURE — 87086 URINE CULTURE/COLONY COUNT: CPT

## 2019-11-01 PROCEDURE — 83735 ASSAY OF MAGNESIUM: CPT

## 2019-11-01 PROCEDURE — 93306 TTE W/DOPPLER COMPLETE: CPT | Mod: 26

## 2019-11-01 PROCEDURE — 87631 RESP VIRUS 3-5 TARGETS: CPT

## 2019-11-01 PROCEDURE — 82607 VITAMIN B-12: CPT

## 2019-11-01 PROCEDURE — 71045 X-RAY EXAM CHEST 1 VIEW: CPT

## 2019-11-01 PROCEDURE — 82550 ASSAY OF CK (CPK): CPT

## 2019-11-01 PROCEDURE — 83605 ASSAY OF LACTIC ACID: CPT

## 2019-11-01 PROCEDURE — 85730 THROMBOPLASTIN TIME PARTIAL: CPT

## 2019-11-01 PROCEDURE — 99239 HOSP IP/OBS DSCHRG MGMT >30: CPT | Mod: GC

## 2019-11-01 PROCEDURE — 86790 VIRUS ANTIBODY NOS: CPT

## 2019-11-01 PROCEDURE — 99285 EMERGENCY DEPT VISIT HI MDM: CPT | Mod: 25

## 2019-11-01 PROCEDURE — 71275 CT ANGIOGRAPHY CHEST: CPT

## 2019-11-01 PROCEDURE — 87207 SMEAR SPECIAL STAIN: CPT

## 2019-11-01 PROCEDURE — 36415 COLL VENOUS BLD VENIPUNCTURE: CPT

## 2019-11-01 PROCEDURE — 86803 HEPATITIS C AB TEST: CPT

## 2019-11-01 PROCEDURE — 81001 URINALYSIS AUTO W/SCOPE: CPT

## 2019-11-01 PROCEDURE — 80053 COMPREHEN METABOLIC PANEL: CPT

## 2019-11-01 PROCEDURE — 87040 BLOOD CULTURE FOR BACTERIA: CPT

## 2019-11-01 PROCEDURE — 84484 ASSAY OF TROPONIN QUANT: CPT

## 2019-11-01 PROCEDURE — 99223 1ST HOSP IP/OBS HIGH 75: CPT | Mod: 25

## 2019-11-01 RX ORDER — ACETAMINOPHEN 500 MG
650 TABLET ORAL ONCE
Refills: 0 | Status: COMPLETED | OUTPATIENT
Start: 2019-11-01 | End: 2019-11-01

## 2019-11-01 RX ORDER — AMLODIPINE BESYLATE 2.5 MG/1
1 TABLET ORAL
Qty: 0 | Refills: 0 | DISCHARGE

## 2019-11-01 RX ORDER — DILTIAZEM HCL 120 MG
1 CAPSULE, EXT RELEASE 24 HR ORAL
Qty: 60 | Refills: 0
Start: 2019-11-01 | End: 2019-11-30

## 2019-11-01 RX ORDER — DILTIAZEM HCL 120 MG
120 CAPSULE, EXT RELEASE 24 HR ORAL DAILY
Refills: 0 | Status: DISCONTINUED | OUTPATIENT
Start: 2019-11-01 | End: 2019-11-01

## 2019-11-01 RX ORDER — METFORMIN HYDROCHLORIDE 850 MG/1
1 TABLET ORAL
Qty: 60 | Refills: 0
Start: 2019-11-01 | End: 2019-11-30

## 2019-11-01 RX ORDER — AMLODIPINE BESYLATE 2.5 MG/1
1 TABLET ORAL
Qty: 30 | Refills: 0
Start: 2019-11-01 | End: 2019-11-30

## 2019-11-01 RX ORDER — DILTIAZEM HCL 120 MG
1 CAPSULE, EXT RELEASE 24 HR ORAL
Qty: 0 | Refills: 0 | DISCHARGE

## 2019-11-01 RX ADMIN — PANTOPRAZOLE SODIUM 40 MILLIGRAM(S): 20 TABLET, DELAYED RELEASE ORAL at 06:15

## 2019-11-01 RX ADMIN — Medication 650 MILLIGRAM(S): at 12:43

## 2019-11-01 RX ADMIN — Medication 1: at 08:29

## 2019-11-01 RX ADMIN — Medication 3: at 12:04

## 2019-11-01 NOTE — PROGRESS NOTE ADULT - ASSESSMENT
59F from home, PMHx of HTN, DM and PSH of kidney stone removal, hysterectomy, cholecystectomy presented to ED c/o weakness.

## 2019-11-01 NOTE — DISCHARGE NOTE PROVIDER - NSDCCPCAREPLAN_GEN_ALL_CORE_FT
PRINCIPAL DISCHARGE DIAGNOSIS  Diagnosis: Electrolyte imbalance  Assessment and Plan of Treatment: You came to the hospital due to generalised wekaness. You were noted to have low potassium and low magnesium levels which could have caused the weakness. You are advised to eat healthy diet to prevent any imbalance of electrolytes. Follow with your PCP and get your blood work done at the PCP's office to get your electrolytes monitored.      SECONDARY DISCHARGE DIAGNOSES  Diagnosis: Hypertension  Assessment and Plan of Treatment: Continue with amlodipine and cardizem. Eat healthy diet rich in fruits and vegetables. Avoid salty and fatty diet .Excercise regularly. See your PCP regularly.    Diagnosis: Tachycardia  Assessment and Plan of Treatment: Your heart rate was high. EKG showed sinus tachycardia. Echocardiogram i.e. ultrasound of your heart was normal. TSH levels were normal. You are advised to take cardizem regularly. Follow with your PCP.    Diagnosis: Diabetes mellitus  Assessment and Plan of Treatment: Your HbA1c was 7.2. Take metformin 500 mg twice daily. Ge your HbA1C checked regularly.

## 2019-11-01 NOTE — CONSULT NOTE ADULT - SUBJECTIVE AND OBJECTIVE BOX
CHIEF COMPLAINT: Weakness    HPI: This is 59 y.o. female with DM, HTN, Dengue fever 8/19 presents with complains of generalized weakness.    PAST MEDICAL & SURGICAL HISTORY:  Dengue fever  Diabetes mellitus  No significant past surgical history      Allergies    No Known Allergies    Intolerances        MEDICATIONS  (STANDING):  acetaminophen   Tablet .. 650 milliGRAM(s) Oral once  dextrose 10% Bolus 125 milliLiter(s) IV Bolus once  dextrose 10% Bolus 250 milliLiter(s) IV Bolus once  dextrose 5%. 1000 milliLiter(s) (50 mL/Hr) IV Continuous <Continuous>  influenza   Vaccine 0.5 milliLiter(s) IntraMuscular once  insulin lispro (HumaLOG) corrective regimen sliding scale   SubCutaneous three times a day before meals  insulin lispro (HumaLOG) corrective regimen sliding scale   SubCutaneous at bedtime  pantoprazole    Tablet 40 milliGRAM(s) Oral before breakfast    MEDICATIONS  (PRN):  dextrose 40% Gel 15 Gram(s) Oral once PRN Blood Glucose LESS THAN 70 milliGRAM(s)/deciLiter  glucagon  Injectable 1 milliGRAM(s) IntraMuscular once PRN Glucose <70 milliGRAM(s)/deciLiter      FAMILY HISTORY:    No family history of premature coronary artery disease or sudden cardiac death    SOCIAL HISTORY:  Smoking-  Alcohol-  Ilicit Drug use-    REVIEW OF SYSTEMS:  Constitutional: [ ] fever, [ ]weight loss,  [ ]fatigue  Eyes: [ ] visual changes  Respiratory: [ ]shortness of breath;  [ ] cough, [ ]wheezing, [ ]chills, [ ]hemoptysis  Cardiovascular: [ ] chest pain, [ ]palpitations, [ ]dizziness,  [ ]leg swelling  Gastrointestinal: [ ] abdominal pain, [ ]nausea, [ ]vomiting,  [ ]diarrhea   Genitourinary: [ ] dysuria, [ ] hematuria  Neurologic: [ ] headaches [ ] tremors  [ ] weakness [ ] lightheadedness  Skin: [ ] itching, [ ]burning, [ ] rashes  Endocrine: [ ] heat or cold intolerance  Musculoskeletal: [ ] joint pain or swelling; [ ] muscle, back, or extremity pain  Psychiatric: [ ] depression, [ ]anxiety, [ ]mood swings, or [ ]difficulty sleeping  Hematologic: [ ] easy bruising, [ ] bleeding gums       [ x] All others negative	  [ ] Unable to obtain    Vital Signs Last 24 Hrs  T(C): 36.6 (01 Nov 2019 11:45), Max: 37 (31 Oct 2019 13:43)  T(F): 97.9 (01 Nov 2019 11:45), Max: 98.6 (31 Oct 2019 13:43)  HR: 106 (01 Nov 2019 11:45) (84 - 109)  BP: 131/85 (01 Nov 2019 11:45) (122/74 - 131/85)  BP(mean): --  RR: 17 (01 Nov 2019 11:45) (17 - 18)  SpO2: 95% (01 Nov 2019 11:45) (95% - 99%)  I&O's Summary      PHYSICAL EXAM:  General: No acute distress  HEENT: EOMI, PERRL  Neck: Supple, No JVD  Lungs: Clear to auscultation bilaterally; No rales or wheezing  Heart: Regular rate and rhythm; No murmurs, rubs, or gallops  Abdomen: Nontender, bowel sounds present  Extremities: No clubbing, cyanosis, or edema  Nervous system:  Alert & Oriented X3, no focal deficits  Psychiatric: Normal affect  Skin: No rashes or lesions      LABS:  11-01    140  |  106  |  9   ----------------------------<  156<H>  3.6   |  28  |  0.74    Ca    8.3<L>      01 Nov 2019 08:05  Phos  2.8     10-31  Mg     1.8     11-01    TPro  7.4  /  Alb  3.4<L>  /  TBili  0.6  /  DBili  x   /  AST  78<H>  /  ALT  92<H>  /  AlkPhos  73  10-31    Creatinine Trend: 0.74<--, 0.83<--, 0.72<--, 0.72<--, 1.02<--                        10.1   3.25  )-----------( 182      ( 01 Nov 2019 08:05 )             30.3     PT/INR - ( 30 Oct 2019 18:06 )   PT: 12.3 sec;   INR: 1.10 ratio         PTT - ( 30 Oct 2019 18:06 )  PTT:29.6 sec    Lipid Panel:   Cardiac Enzymes: CARDIAC MARKERS ( 31 Oct 2019 04:25 )  0.020 ng/mL / x     / 74 U/L / x     / <1.0 ng/mL  CARDIAC MARKERS ( 30 Oct 2019 18:06 )  <0.015 ng/mL / x     / x     / x     / x          Serum Pro-Brain Natriuretic Peptide: 2848 pg/mL (10-30-19 @ 18:06)    10-31 [id number] 7.2      RADIOLOGY: 	< from: Xray Chest 1 View- PORTABLE-Urgent (10.30.19 @ 18:24) >    The lung fields and pleural surfaces are unremarkable.    IMPRESSION: No infiltrate.      < end of copied text >      < from: CT Angio Chest w/ IV Cont (10.31.19 @ 02:11) >  The lung windows demonstrate two subcentimeter,   subpleural nodules of both the right and left lungs with the larger of   the two measuring 5.8 mm in diameter located on image 41 of series 3.    The mediastinum shows normal heart size with no evidence of adenopathy.   There is no evidence of central or proximal pulmonary emboli. Peripheral   pulmonary artery opacification is suboptimal.    No pericardial nor pleural effusion is identified.      The trachea and proximal bronchi show no focal lesions.      The bony structures show no gross destructive changes.      Below the hemidiaphragms, there is evidence of hepatic cirrhosis with   what appear to be venous varicosities along the splenic vein.      IMPRESSION: No evidence of CT detectable pulmonary emboli. Evidence of   portal hypertension as noted.      < end of copied text >      ECG [my interpretation]: < from: 12 Lead ECG (10.30.19 @ 17:32) >   Sinus tachycardia  Possible Left atrial enlargement  T wave abnormality, consider inferior ischemia  Abnormal ECG    < end of copied text >      TELEMETRY:    ECHO:     STRESS TEST:    CATHETERIZATION: CHIEF COMPLAINT: Weakness    HPI: This is 59 y.o. female with DM, HTN, Dengue fever who speaks Bangali and minimal English. [facility]  # [id number] utilized. Patient reports generalized weakness and dizziness for the past 2 months. She recently visited [facility] for 3 months and returned 10/27/19, was treated there for Dengue fever post a mosquito bite. She denies any headaches, chest pains, palpitations, nausea, vomiting, dyspnea, orthopnea, PND. Her appetite has been poor, she eats mostly rice and bread. She lives with her family and is independent in her ADL's. She never had any cardiac work-up, was prescribed Cardizem in [facility] many years ago for HTN. She doesn't have PMD here as she refers to the Dr. Yancey from [facility]. Patient was able to lay flat for the duration of this evaluation without any SOB.    PAST MEDICAL & SURGICAL HISTORY:  Dengue fever  Diabetes mellitus  No significant past surgical history      Allergies    No Known Allergies    Intolerances        MEDICATIONS  (STANDING):  acetaminophen   Tablet .. 650 milliGRAM(s) Oral once  dextrose 10% Bolus 125 milliLiter(s) IV Bolus once  dextrose 10% Bolus 250 milliLiter(s) IV Bolus once  dextrose 5%. 1000 milliLiter(s) (50 mL/Hr) IV Continuous <Continuous>  influenza   Vaccine 0.5 milliLiter(s) IntraMuscular once  insulin lispro (HumaLOG) corrective regimen sliding scale   SubCutaneous three times a day before meals  insulin lispro (HumaLOG) corrective regimen sliding scale   SubCutaneous at bedtime  pantoprazole    Tablet 40 milliGRAM(s) Oral before breakfast    MEDICATIONS  (PRN):  dextrose 40% Gel 15 Gram(s) Oral once PRN Blood Glucose LESS THAN 70 milliGRAM(s)/deciLiter  glucagon  Injectable 1 milliGRAM(s) IntraMuscular once PRN Glucose <70 milliGRAM(s)/deciLiter      FAMILY HISTORY:    No family history of premature coronary artery disease or sudden cardiac death    SOCIAL HISTORY:  Smoking-denies  Alcohol-denies  Ilicit Drug use-denies    REVIEW OF SYSTEMS:  Constitutional: [X ] fever, [X ]weight loss,  [X ]fatigue  Eyes: [ ] visual changes  Respiratory: [ ]shortness of breath;  [ ] cough, [ ]wheezing, [ ]chills, [ ]hemoptysis  Cardiovascular: [ ] chest pain, [ ]palpitations, [X ]dizziness,  [ ]leg swelling  Gastrointestinal: [ ] abdominal pain, [ ]nausea, [ ]vomiting,  [ ]diarrhea   Genitourinary: [ ] dysuria, [ ] hematuria  Neurologic: [ ] headaches [ ] tremors  [ ] weakness [ ] lightheadedness  Skin: [ ] itching, [ ]burning, [ ] rashes  Endocrine: [ ] heat or cold intolerance  Musculoskeletal: [ ] joint pain or swelling; [ ] muscle, back, or extremity pain  Psychiatric: [ ] depression, [ ]anxiety, [ ]mood swings, or [ ]difficulty sleeping  Hematologic: [ ] easy bruising, [ ] bleeding gums       [ x] All others negative	  [ ] Unable to obtain    Vital Signs Last 24 Hrs  T(C): 36.6 (01 Nov 2019 11:45), Max: 37 (31 Oct 2019 13:43)  T(F): 97.9 (01 Nov 2019 11:45), Max: 98.6 (31 Oct 2019 13:43)  HR: 106 (01 Nov 2019 11:45) (84 - 109)  BP: 131/85 (01 Nov 2019 11:45) (122/74 - 131/85)  BP(mean): --  RR: 17 (01 Nov 2019 11:45) (17 - 18)  SpO2: 95% (01 Nov 2019 11:45) (95% - 99%)  I&O's Summary      PHYSICAL EXAM:  General: No acute distress  HEENT: EOMI, PERRL  Neck: Supple, No JVD  Lungs: Clear to auscultation bilaterally; No rales or wheezing  Heart: Regular rate and rhythm; No murmurs, rubs, or gallops  Abdomen: Nontender, bowel sounds present  Extremities: No clubbing, cyanosis, or edema  Nervous system:  Alert & Oriented X3, no focal deficits  Psychiatric: Normal affect  Skin: No rashes or lesions      LABS:  11-01    140  |  106  |  9   ----------------------------<  156<H>  3.6   |  28  |  0.74    Ca    8.3<L>      01 Nov 2019 08:05  Phos  2.8     10-31  Mg     1.8     11-01    TPro  7.4  /  Alb  3.4<L>  /  TBili  0.6  /  DBili  x   /  AST  78<H>  /  ALT  92<H>  /  AlkPhos  73  10-31    Creatinine Trend: 0.74<--, 0.83<--, 0.72<--, 0.72<--, 1.02<--                        10.1   3.25  )-----------( 182      ( 01 Nov 2019 08:05 )             30.3     PT/INR - ( 30 Oct 2019 18:06 )   PT: 12.3 sec;   INR: 1.10 ratio         PTT - ( 30 Oct 2019 18:06 )  PTT:29.6 sec    Lipid Panel:   Cardiac Enzymes: CARDIAC MARKERS ( 31 Oct 2019 04:25 )  0.020 ng/mL / x     / 74 U/L / x     / <1.0 ng/mL  CARDIAC MARKERS ( 30 Oct 2019 18:06 )  <0.015 ng/mL / x     / x     / x     / x          Serum Pro-Brain Natriuretic Peptide: 2848 pg/mL (10-30-19 @ 18:06)    10-31 [id number] 7.2      RADIOLOGY: 	< from: Xray Chest 1 View- PORTABLE-Urgent (10.30.19 @ 18:24) >    The lung fields and pleural surfaces are unremarkable.    IMPRESSION: No infiltrate.      < end of copied text >      < from: CT Angio Chest w/ IV Cont (10.31.19 @ 02:11) >  The lung windows demonstrate two subcentimeter,   subpleural nodules of both the right and left lungs with the larger of   the two measuring 5.8 mm in diameter located on image 41 of series 3.    The mediastinum shows normal heart size with no evidence of adenopathy.   There is no evidence of central or proximal pulmonary emboli. Peripheral   pulmonary artery opacification is suboptimal.    No pericardial nor pleural effusion is identified.      The trachea and proximal bronchi show no focal lesions.      The bony structures show no gross destructive changes.      Below the hemidiaphragms, there is evidence of hepatic cirrhosis with   what appear to be venous varicosities along the splenic vein.      IMPRESSION: No evidence of CT detectable pulmonary emboli. Evidence of   portal hypertension as noted.      < end of copied text >      ECG [my interpretation]: < from: 12 Lead ECG (10.30.19 @ 17:32) >   Sinus tachycardia  Possible Left atrial enlargement  T wave abnormality, consider inferior ischemia  Abnormal ECG    < end of copied text >      TELEMETRY: Sinus Tachycardia with run of Atac -993's    ECHO: Pending    STRESS TEST:    CATHETERIZATION: CHIEF COMPLAINT: Weakness    HPI: This is 59 y.o. female with DM, HTN, Dengue fever who speaks Bangali and minimal English. [facility]  # [id number] utilized. Patient reports generalized weakness and dizziness for the past 2 months. She recently visited [facility] for 3 months and returned 10/27/19, was treated there for Dengue fever post a mosquito bite. She denies any headaches, chest pains, palpitations, nausea, vomiting, dyspnea, orthopnea, PND. Her appetite has been poor, she eats mostly rice and bread. She lives with her family and is independent in her ADL's. She never had any cardiac work-up, was prescribed Cardizem in [facility] many years ago for HTN. She doesn't have PMD here as she refers to the Dr. Yancey from [facility]. Patient was able to lay flat for the duration of this evaluation without any SOB.    PAST MEDICAL & SURGICAL HISTORY:  Dengue fever  Diabetes mellitus  No significant past surgical history      Allergies    No Known Allergies    MEDICATIONS  (STANDING):  acetaminophen   Tablet .. 650 milliGRAM(s) Oral once  dextrose 10% Bolus 125 milliLiter(s) IV Bolus once  dextrose 10% Bolus 250 milliLiter(s) IV Bolus once  dextrose 5%. 1000 milliLiter(s) (50 mL/Hr) IV Continuous <Continuous>  influenza   Vaccine 0.5 milliLiter(s) IntraMuscular once  insulin lispro (HumaLOG) corrective regimen sliding scale   SubCutaneous three times a day before meals  insulin lispro (HumaLOG) corrective regimen sliding scale   SubCutaneous at bedtime  pantoprazole    Tablet 40 milliGRAM(s) Oral before breakfast    MEDICATIONS  (PRN):  dextrose 40% Gel 15 Gram(s) Oral once PRN Blood Glucose LESS THAN 70 milliGRAM(s)/deciLiter  glucagon  Injectable 1 milliGRAM(s) IntraMuscular once PRN Glucose <70 milliGRAM(s)/deciLiter      FAMILY HISTORY:    No family history of premature coronary artery disease or sudden cardiac death    SOCIAL HISTORY:  Smoking-denies  Alcohol-denies  Ilicit Drug use-denies    REVIEW OF SYSTEMS:  Constitutional: [X ] fever, [X ]weight loss,  [X ]fatigue  Eyes: [ ] visual changes  Respiratory: [ ]shortness of breath;  [ ] cough, [ ]wheezing, [ ]chills, [ ]hemoptysis  Cardiovascular: [ ] chest pain, [ ]palpitations, [X ]dizziness,  [ ]leg swelling  Gastrointestinal: [ ] abdominal pain, [ ]nausea, [ ]vomiting,  [ ]diarrhea   Genitourinary: [ ] dysuria, [ ] hematuria  Neurologic: [ ] headaches [ ] tremors  [ ] weakness [ ] lightheadedness  Skin: [ ] itching, [ ]burning, [ ] rashes  Endocrine: [ ] heat or cold intolerance  Musculoskeletal: [ ] joint pain or swelling; [ ] muscle, back, or extremity pain  Psychiatric: [ ] depression, [ ]anxiety, [ ]mood swings, or [ ]difficulty sleeping  Hematologic: [ ] easy bruising, [ ] bleeding gums       [ x] All others negative	  [ ] Unable to obtain    Vital Signs Last 24 Hrs  T(C): 36.6 (01 Nov 2019 11:45), Max: 37 (31 Oct 2019 13:43)  T(F): 97.9 (01 Nov 2019 11:45), Max: 98.6 (31 Oct 2019 13:43)  HR: 106 (01 Nov 2019 11:45) (84 - 109)  BP: 131/85 (01 Nov 2019 11:45) (122/74 - 131/85)  BP(mean): --  RR: 17 (01 Nov 2019 11:45) (17 - 18)  SpO2: 95% (01 Nov 2019 11:45) (95% - 99%)  I&O's Summary      PHYSICAL EXAM:  General: No acute distress  HEENT: EOMI, PERRL  Neck: Supple, No JVD  Lungs: Clear to auscultation bilaterally; No rales or wheezing  Heart: Regular rate and rhythm; No murmurs, rubs, or gallops  Abdomen: Nontender, bowel sounds present  Extremities: No clubbing, cyanosis, or edema  Nervous system:  Alert & Oriented X3, no focal deficits  Psychiatric: Normal affect  Skin: No rashes or lesions      LABS:  11-01    140  |  106  |  9   ----------------------------<  156<H>  3.6   |  28  |  0.74    Ca    8.3<L>      01 Nov 2019 08:05  Phos  2.8     10-31  Mg     1.8     11-01    TPro  7.4  /  Alb  3.4<L>  /  TBili  0.6  /  DBili  x   /  AST  78<H>  /  ALT  92<H>  /  AlkPhos  73  10-31    Creatinine Trend: 0.74<--, 0.83<--, 0.72<--, 0.72<--, 1.02<--                        10.1   3.25  )-----------( 182      ( 01 Nov 2019 08:05 )             30.3     PT/INR - ( 30 Oct 2019 18:06 )   PT: 12.3 sec;   INR: 1.10 ratio         PTT - ( 30 Oct 2019 18:06 )  PTT:29.6 sec    Lipid Panel:   Cardiac Enzymes: CARDIAC MARKERS ( 31 Oct 2019 04:25 )  0.020 ng/mL / x     / 74 U/L / x     / <1.0 ng/mL  CARDIAC MARKERS ( 30 Oct 2019 18:06 )  <0.015 ng/mL / x     / x     / x     / x          Serum Pro-Brain Natriuretic Peptide: 2848 pg/mL (10-30-19 @ 18:06)    10-31 [id number] 7.2      RADIOLOGY: 	< from: Xray Chest 1 View- PORTABLE-Urgent (10.30.19 @ 18:24) >    The lung fields and pleural surfaces are unremarkable.    IMPRESSION: No infiltrate.      < end of copied text >      < from: CT Angio Chest w/ IV Cont (10.31.19 @ 02:11) >  The lung windows demonstrate two subcentimeter,   subpleural nodules of both the right and left lungs with the larger of   the two measuring 5.8 mm in diameter located on image 41 of series 3.    The mediastinum shows normal heart size with no evidence of adenopathy.   There is no evidence of central or proximal pulmonary emboli. Peripheral   pulmonary artery opacification is suboptimal.    No pericardial nor pleural effusion is identified.      The trachea and proximal bronchi show no focal lesions.      The bony structures show no gross destructive changes.      Below the hemidiaphragms, there is evidence of hepatic cirrhosis with   what appear to be venous varicosities along the splenic vein.      IMPRESSION: No evidence of CT detectable pulmonary emboli. Evidence of   portal hypertension as noted.      < end of copied text >      ECG [my interpretation]: < from: 12 Lead ECG (10.30.19 @ 17:32) >   Sinus tachycardia  Possible Left atrial enlargement  T wave abnormality, consider inferior ischemia  Abnormal ECG    < end of copied text >      TELEMETRY: Sinus Tachycardia with run of Atac -146's; currently in sinus rhythm in 80s    ECHO: < from: Transthoracic Echocardiogram (10.31.19 @ 07:46) >  CONCLUSIONS:  1. Normal mitral valve. Trace mitral regurgitation.  2. Probably trileaflet aortic valve is not well seen. No  aortic stenosis. No aortic valve regurgitation seen.  3. Normal aortic root.  4. Normal leftatrium.  5. Normal left ventricular internal dimensions and wall  thicknesses.  6. Normal Left Ventricular Systolic Function,  (EF = 62%)  7. Normal right atrium.  8. Normal right ventricular size and systolic function  (TAPSE 2.0 cm).  9. Normal tricuspid valve. Trace tricuspid regurgitation.  10. Pulmonic valve not well seen. Trace pulmonic  insufficiency is noted.  11. No pericardial effusion.    < end of copied text >

## 2019-11-01 NOTE — CONSULT NOTE ADULT - ASSESSMENT
This is 59 y.o. female with HTN, DM, presents with This is 59 y.o. female with HTN, DM, presents with ta This is 59 y.o. female with HTN, DM, presents with complains of generalized weakness in the setting of Sinus Tachycardia.    1. Sinus Tachycardia- can be rebound secondary to not taking Cardizem    2. Elevated BNP- Echo Pending, but pt appears euvolemic.    3.  HTN    4. DM    these are preliminary recommendations and should be not followed until note is signed by attending cardiologist. This is 59 y.o. female with HTN, DM, presents with complains of generalized weakness in the setting of Sinus Tachycardia.    1. Sinus Tachycardia- can be rebound secondary to not taking Cardizem    2. Elevated pro-BNP (2848)  Echo Pending, but pt appears euvolemic.    3.  HTN    4. DM    these are preliminary recommendations and should be not followed until note is signed by attending cardiologist. This is 59 y.o. female with HTN, DM, presents with complains of generalized weakness, with cardiology service consulted to assist with Sinus Tachycardia.    1. Sinus Tachycardia- Likely etiology is withdrawal from diltiazem  -Patient received dose this afternoon with decrease in HR to the 80s  -Denies any palpitations, dyspnea, or chest pain  -Echocardiogram shows structurally normal heart    2. Elevated pro-BNP (2848):  Echo shows preserved EF.  -Patient is euvolemic on exam, has no orthopnea or PND, and no evidence of pleural effusions on CT scan. Possible elevated levels in setting of cirrhosis;     3.  HTN: Resume diltiazem

## 2019-11-01 NOTE — PROGRESS NOTE ADULT - SUBJECTIVE AND OBJECTIVE BOX
Patient is a 59y old  Female who presents with a chief complaint of Generalized weakness (2019 12:12)      INTERVAL HPI/OVERNIGHT EVENTS: seen and examined together with residents and medical students. Pts sister was present during examination and helped with translation.   Patient herself had no complains, reported feeling better and wanting to go home.     MEDICATIONS  (STANDING):  dextrose 10% Bolus 125 milliLiter(s) IV Bolus once  dextrose 10% Bolus 250 milliLiter(s) IV Bolus once  dextrose 5%. 1000 milliLiter(s) (50 mL/Hr) IV Continuous <Continuous>  diltiazem    milliGRAM(s) Oral daily  influenza   Vaccine 0.5 milliLiter(s) IntraMuscular once  insulin lispro (HumaLOG) corrective regimen sliding scale   SubCutaneous three times a day before meals  insulin lispro (HumaLOG) corrective regimen sliding scale   SubCutaneous at bedtime  pantoprazole    Tablet 40 milliGRAM(s) Oral before breakfast    MEDICATIONS  (PRN):  dextrose 40% Gel 15 Gram(s) Oral once PRN Blood Glucose LESS THAN 70 milliGRAM(s)/deciLiter  glucagon  Injectable 1 milliGRAM(s) IntraMuscular once PRN Glucose <70 milliGRAM(s)/deciLiter      Allergies    No Known Allergies    Intolerances        REVIEW OF SYSTEMS:  CONSTITUTIONAL: No fever, weight loss, or fatigue  RESPIRATORY: No cough, wheezing, chills or hemoptysis; No shortness of breath  CARDIOVASCULAR: No chest pain, palpitations, dizziness, or leg swelling  GASTROINTESTINAL: No abdominal or epigastric pain. No nausea, vomiting, or hematemesis; No diarrhea or constipation. No melena or hematochezia.  NEUROLOGICAL: No headaches, memory loss, loss of strength, numbness, or tremors  MUSCULOSKELETAL: No joint pain or swelling; No muscle, back, or extremity pain      Vital Signs Last 24 Hrs  T(C): 36.7 (2019 14:13), Max: 36.8 (2019 01:45)  T(F): 98 (2019 14:13), Max: 98.3 (2019 01:45)  HR: 100 (2019 14:13) (84 - 106)  BP: 133/89 (2019 14:13) (122/74 - 133/89)  BP(mean): --  RR: 18 (2019 14:13) (17 - 18)  SpO2: 100% (2019 14:13) (95% - 100%)    PHYSICAL EXAM:  GENERAL: NAD, well-groomed, well-developed  HEAD:  Atraumatic, Normocephalic  EYES: EOMI, PERRLA, conjunctiva and sclera clear  NECK: Supple, No JVD, Normal thyroid  NERVOUS SYSTEM:  Alert & Oriented X3, No gross focal deficits  CHEST/LUNG: Clear to percussion bilaterally; No rales, rhonchi, wheezing, or rubs  HEART: Tachycardiac, No murmurs, rubs, or gallops  ABDOMEN: Soft, Nontender, Nondistended; Bowel sounds present  EXTREMITIES:  No clubbing, cyanosis, or edema  SKIN: No rashes or lesions    LABS:                        10.1   3.25  )-----------( 182      ( 2019 08:05 )             30.3     11    140  |  106  |  9   ----------------------------<  156<H>  3.6   |  28  |  0.74    Ca    8.3<L>      2019 08:05  Phos  2.8     10-31  Mg     1.8     11    TPro  7.4  /  Alb  3.4<L>  /  TBili  0.6  /  DBili  x   /  AST  78<H>  /  ALT  92<H>  /  AlkPhos  73  10-31    PT/INR - ( 30 Oct 2019 18:06 )   PT: 12.3 sec;   INR: 1.10 ratio         PTT - ( 30 Oct 2019 18:06 )  PTT:29.6 sec  Urinalysis Basic - ( 30 Oct 2019 19:03 )    Color: Yellow / Appearance: Clear / S.010 / pH: x  Gluc: x / Ketone: Negative  / Bili: Negative / Urobili: Negative   Blood: x / Protein: Negative / Nitrite: Negative   Leuk Esterase: Small / RBC: 0-2 /HPF / WBC 3-5 /HPF   Sq Epi: x / Non Sq Epi: Few /HPF / Bacteria: Few /HPF      CAPILLARY BLOOD GLUCOSE      POCT Blood Glucose.: 263 mg/dL (2019 11:59)  POCT Blood Glucose.: 184 mg/dL (2019 08:14)  POCT Blood Glucose.: 146 mg/dL (31 Oct 2019 16:42)      RADIOLOGY & ADDITIONAL TESTS:    Imaging Personally Reviewed:  [ ] YES  [ ] NO    Consultant(s) Notes Reviewed:  [ ] YES  [ ] NO    Care Discussed with Consultants/Other Providers [ ] YES  [ ] NO    Plan of Care discussed with Housestaff [ ]YES [ ] NO

## 2019-11-01 NOTE — DISCHARGE NOTE PROVIDER - NSDCMRMEDTOKEN_GEN_ALL_CORE_FT
amLODIPine 5 mg oral tablet: 1 tab(s) orally once a day  Cardizem SR 90 mg/12 hours oral capsule, extended release: 1 cap(s) orally every 12 hours  Protonix 20 mg oral delayed release tablet: 1 tab(s) orally once a day amLODIPine 5 mg oral tablet: 1 tab(s) orally once a day  Cardizem SR 90 mg/12 hours oral capsule, extended release: 1 cap(s) orally every 12 hours  metFORMIN 500 mg oral tablet: 1 tab(s) orally 2 times a day   Protonix 20 mg oral delayed release tablet: 1 tab(s) orally once a day

## 2019-11-01 NOTE — PROGRESS NOTE ADULT - PROBLEM SELECTOR PLAN 1
On admission levels of Mg and K were low   The generalized weakness maybe contributed to hypomagnesemia and hypokalemia   Both serum magnesium and serum potassium improved.

## 2019-11-01 NOTE — DISCHARGE NOTE PROVIDER - HOSPITAL COURSE
59F with PMHx of HTN, DM and PSH of kidney stone removal, hysterectomy, cholecystectomy presented to ED because of generalised weakness.    Lactate was elevated on admission. WBC were normal and pt was afebrile. CXR, UA and UCx were negative and there were no signs of inction. Repeat lactate went down.    EKG showed sinus tachycardia. TroponinX2 were negative. CTA chest showed no Pulmonary embolism; ,mild bilateral lower lobe dependent air space opacity-atelectasis/edema; several peripheral subcentimeter soft tissue nodules, particularly right middle lobe. 59F with PMHx of HTN, DM and PSH of kidney stone removal, hysterectomy, cholecystectomy presented to ED because of generalized weakness.    Pt had hypokalemia and hypomagnesemia. The electrolytes were replaced.     Lactate was elevated on admission. WBC were normal and pt was afebrile. CXR, UA and UCx were negative and there were no signs of infection. Repeat lactate went down.    EKG showed sinus tachycardia. TroponinX2 were negative. CTA chest showed no Pulmonary embolism; mild bilateral lower lobe dependent air space opacity-atelectasis/edema; several peripheral subcentimeter soft tissue nodules, particularly right middle lobe. 59F with PMHx of HTN, DM and PSH of kidney stone removal, hysterectomy, cholecystectomy presented to ED because of generalized weakness.    Pt had hypokalemia and hypomagnesemia. The electrolytes were replaced. Weakness was likely due to electrolyte imbalance.     Lactate was elevated on admission. WBC were normal and pt was afebrile. CXR, UA and UCx were negative and there were no signs of infection. Repeat lactate went down.    EKG showed sinus tachycardia. TroponinX2 were negative. CTA chest showed no Pulmonary embolism. Echo was normal with EF 62%.

## 2019-11-01 NOTE — DISCHARGE NOTE NURSING/CASE MANAGEMENT/SOCIAL WORK - PATIENT PORTAL LINK FT
You can access the FollowMyHealth Patient Portal offered by [facility] by registering at the following website: [email or web address]. By joining [employer]’s FollowMyHealth portal, you will also be able to view your health information using other applications (apps) compatible with our system.

## 2019-11-03 LAB
CHIKUNGUNYA AB IGG IGM W/REFLEX RESULT: SIGNIFICANT CHANGE UP
CHIKV AB TITR SER: SIGNIFICANT CHANGE UP

## 2019-11-05 LAB
CULTURE RESULTS: SIGNIFICANT CHANGE UP
CULTURE RESULTS: SIGNIFICANT CHANGE UP
SPECIMEN SOURCE: SIGNIFICANT CHANGE UP
SPECIMEN SOURCE: SIGNIFICANT CHANGE UP

## 2019-11-11 LAB
DENV IGG+IGM PNL SER IA: >15 ISR — HIGH
DENV IGM SER-ACNC: 1.05 ISR — SIGNIFICANT CHANGE UP

## 2021-09-01 NOTE — H&P ADULT - PROBLEM SELECTOR PROBLEM 3
Your current Orthopaedic problem we are working together to treat is:  Post concussion.      CARE  · Get established with a primary care physician.  Recommend [facility].  · Continue physical therapy.  Tell them that an order is in for vertigo treatment.  · Daily exercise.  Walking, stationary bike to start.  Gradually increase duration and then increase intensity  · Melatonin 2 to 3 mg one hour before bed if you have difficulty sleeping.  · Sleep recommendations below.  · As a supplementation with concussion recovery you may take Omega 3s in the following doses:   · Week 1: 9000mg/day  · Week 2: 6000mg/day  · Weeks 3-4: 3000mg/day  · These doses should be split up throughout the day to make tolerable. Additionally you may freeze or keep in the fridge to decrease fishy taste.     HOME COGNITIVE EXERCISES  Here is a list of games/resources that could be helpful for if you are interested in cognitive rehab at home. Most important thing to remember to do perform this in small doses, keeping in mind if you are overworking muscles in your body they'll fail you, same with the brain. Start low, go slow and increase steadily.     Games:  • Taboo- word finding, thought formulation, speed of processing, tension, recall  • Scattegories- abstract word finding, recall  • Catch phrase- word finding, speed of processing, attention  • Sequence- visual perception, memory, problem solving with strategy  • Mastermind/Murder Mystery- visual attention to detail,  reasoning  • Bananagrams- visual attention to detail, speed of processing, mental flexibility for problem solving, word finding, spelling  • Sudoku/Colorku- Visual attention to detail,  reasoning, speed of processing  • [place]- auditory attention to verbal information, speech analysis and re-synthesis  • Q-bitz- Visual attention to detail, visual perception, speed of processing, visual memory  • Jigsaw puzzles- visual attention to details, visual integration  of information, speed of processing  • Logic Links- attention tubercle details, verbal  reasoning  • Link 26- concrete and abstract word finding, spelling, speed of processing  • Clue- auditory and visual attention, reasoning and  problem-solving  • Scrabble- attention to details, vocabulary, spelling  • SET- visual attention to details, visual perception, speed of processing  • Skip-claude- visual attention to details, problem solving  • Phase 10- visual attention to details, planning, problem solving  • Fluxx- attention to details, memory, recall, mental flexibility for problems solving    EDUCATION: SLEEP HYGIENE  DO:  1. Go to bed at the same time each day.  2. Get up from bed at the same time each day.  3. Get regular exercise each day, preferably in the morning. There is good evidence that regular exercise improves restful sleep. This includes stretching and aerobic exercise.  4. Get regular exposure to outdoor or bright lights, especially in the late afternoon.  5. Keep the temperature in your bedroom comfortable.  6. Keep the bedroom quiet when sleeping.  7. Keep the bedroom dark enough to facilitate sleep.  8. Use your bed only for sleep and sex.  9. Take medications as directed. It is often helpful to take prescribed sleeping pills one hour before bedtime, so they are causing drowsiness when you lie down or 10 hours before getting up, to avoid daytime drowsiness.  10. Use a relaxation exercise just before going to sleep such has muscle relaxation, imagery, massage, warm bath, etc.  11. Keep your feet and hands warm. Wear warm socks and or mittens or gloves to bed.    DO NOT:  1. Exercise just before going to bed.  2. Engage in stimulating activity just before bed, such as playing a competitive game, watching an exciting program on television or a movie, or having an important discussion with a loved one.  3. Have caffeine in the evening (coffee, many teas, chocolate, soda, etc.).  4. Read or  watch television in bed.  5. Use alcohol to help you sleep.  6. Go to bed too hungry or too full.  7. Take another person's sleeping pills.  8. Take over-the-counter sleeping pills without your doctor's knowledge. Tolerance can develop rapidly with these medications. Diphenhydramine (an ingredient commonly found in over-the-counter sleep medications) can have serious side effects for elderly patients.  9. Take daytime naps.  10. Command yourself to go to sleep. This only makes your mind and body more alert.    If you lie in bed awake for more than 20-30 minutes, get up, go to a different room or different part of the bedroom until you feel more relaxed, then return to bed when you feel sleepy. Do this as many times during the night as needed.      It is recommended you schedule a follow-up appointment with Mike Low MD in 4 weeks.      Office hours are 8:00 am to 5:00 pm Monday through Friday. If it is urgent that you speak with someone outside of these hours, our [facility] [place]note 2] Call Center will be able to assist you. You can reach the office by calling the [facility] at [phone], [address] at [phone] or [facility] at [phone].    We do highly recommend [employer], if you do not already have this. You can request access via the internet or by simply talking with a  at any of the clinics.   [email or web address].      Thank you for choosing [facility] [place] 2[facility] as your Orthopedic provider!        References  1. Joseline AH, Nikolay RAFAT. Clinical practice: concussion. N Engl J Med. 2007;356:166-172.  2. Yobany TBryan C. Concussion assessment and management. Clin Sports Med. 2009;5-17.  3. Yoanna MH, Lyle WG. Head injury. In: Bharat HUMPHRIES, Phill RS, Mele RM, et al, eds. Angel's Emergency Medicine: Concepts and Clinical Practice. 7th ed. [place], [place]: Delilah Fontanez; 2009:chap  38.  4. [email or web address]-Traumatic-Brain-Injury-Workbook/[email or web address]=asc_df_[email or web address]/?tag=hyprod-20&linkCode=df0&jbaqpv=[id number]&hvpos=1o1&hvnetw=g&mcdzcw=[id number]&hvpone=&hvptwo=&hvqmt=&hvdev=c&hvdvcmdl=&hvlocint=&hjfuhhat=[id number]&hvtargid=[id number]&psc=1  5. https://[email or web address]-Injury-Workbook-Rehabilitation-Speechmark/[email or web address]/ref=pd_bxgy_14_img_3/[email or web address]?_encoding=UTF8&pd_rd_i=[id number]&pd_rd_r=[id number]&pd_rd_w=3kxEu&pd_rd_wg=8exJw&pf_rd_p=[id number]&pf_rd_r=[id number]&psc=1&racVAT=[id number]  6. https://www.[email or web address]/Brain-Injury-Survival-Kit-Cognitive/[email or web address]/ref=pd_bxgy_14_img_[email or web address]?_encoding=UTF8&pd_rd_i=[id number]&pd_rd_r=[id number]&pd_rd_w=3kxEu&pd_rd_wg=8exJw&pf_rd_p=[id number]&pf_rd_r=[id number]&psc=1&snrTYH=[id number]       Transaminitis

## 2022-05-25 NOTE — ED PROVIDER NOTE - CROS ED CONS ALL NEG
- - - Detail Level: Detailed Initiate Treatment: Protopic twice daily as directed Render In Strict Bullet Format?: No

## 2023-05-20 NOTE — ED PROVIDER NOTE - DATE/TIME 1
Hospitalist Progress Note    Patient:  Ross Price      Unit/Bed:8A-16/016-A    YOB: 1974    MRN: [id number]       Acct: [de-identified]     PCP: No primary care provider on file. Date of Admission: 5/20/2023    Assessment/Plan:    Acute chest pain--ACS ruled out by 3 negative troponins; BNP normal; D-dimer is normal; chest x-ray reveals no acute abnormalities; EKG is showing sinus rhythm heart rate 66; on aspirin, statin; echo showed EF 60% and overall left ventricular function is normal; unknown etiology at this time, possibly secondary to her blood pressure being high on admission; definitely needs outpatient PCP follow-up in [place]  Primary hypertension, uncontrolled--does not appear she is on any home medications; resolved nicely; definitely needs follow-up with PCP  Subclinical hypothyroidism--TSH noted to be 5.57 with free T4 at 1.45  Leukocytosis, mild  Hyperlipidemia--started on statin on admission, cholesterol 226, LDL at 142, recommend outpatient follow-up  Obesity class III with BMI 47.04      Expected discharge date: Today    Disposition:    [x] Home       [] TCU       [] Rehab       [] Psych       [] SNF       [] [facility]       [] Other-    Chief Complaint: Chest pain    Hospital Course: H&P done 5/20/2023: \"Patient presents to the ED with chest pain in the left substernal chest that radiates to the axilla and middle back. The patient has no history of heart disease. There is no family history of heart disease. She has no smoking history. She has a history of hyperlipidemia and hypertension , she does not take her medications regularly. Patient states the pain started two days prior to admission. It was no associated with activity and was no relieved by rest.  It was progressively worse throughout the day and was the worst when she laid down to attempt to sleep. The patient will be admitted for ACS rule out. \"    5/20--> currently hemodynamically 30-Oct-2019 19:30